# Patient Record
Sex: MALE | Race: WHITE
[De-identification: names, ages, dates, MRNs, and addresses within clinical notes are randomized per-mention and may not be internally consistent; named-entity substitution may affect disease eponyms.]

---

## 2020-01-13 ENCOUNTER — HOSPITAL ENCOUNTER (EMERGENCY)
Dept: HOSPITAL 4 - SED | Age: 45
Discharge: HOME | End: 2020-01-13
Payer: COMMERCIAL

## 2020-01-13 VITALS — SYSTOLIC BLOOD PRESSURE: 131 MMHG

## 2020-01-13 VITALS — BODY MASS INDEX: 25.77 KG/M2 | HEIGHT: 70 IN | WEIGHT: 180 LBS

## 2020-01-13 VITALS — SYSTOLIC BLOOD PRESSURE: 143 MMHG

## 2020-01-13 VITALS — HEIGHT: 70 IN | BODY MASS INDEX: 26.48 KG/M2 | WEIGHT: 185 LBS

## 2020-01-13 VITALS — SYSTOLIC BLOOD PRESSURE: 152 MMHG

## 2020-01-13 DIAGNOSIS — T78.1XXA: Primary | ICD-10-CM

## 2020-01-13 DIAGNOSIS — T78.49XA: Primary | ICD-10-CM
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
## 2020-01-13 PROCEDURE — 96372 THER/PROPH/DIAG INJ SC/IM: CPT

## 2020-01-13 PROCEDURE — 99283 EMERGENCY DEPT VISIT LOW MDM: CPT

## 2020-01-13 NOTE — NUR
Patient given written and verbal discharge instructions and verbalizes 
understanding.  ER MD discussed with patient the results and treatment 
provided. Patient in stable condition. ID arm band removed. Rx of Epi-Pen and 
Benadryl given. Patient educated on pain management and to follow up with PMD. 
Pain Scale 0. Opportunity for questions provided and answered. Medication side 
effect fact sheet provided.

## 2020-01-13 NOTE — NUR
Patient arrived in the ED c/o swollen lips that started today - Took Benadryl 
25mg PO.  Was seen yesterday for the same symptoms.  Denied any shortness of 
breath. Denied any fevers or chills.  Alert and oriented x4, respirations even 
and unlabored, speaking in full sentences and ambulating with a steady gait.  
Instructed to notify ED staff for any changes in condition or worsening of 
symptoms.  Patient verbalized understanding.

## 2020-01-13 NOTE — NUR
Pt has been medicated with Benadryl IM for symptoms. Tolerated medication well, 
will continue to monitor.

## 2020-01-13 NOTE — NUR
Patient given written and verbal discharge instructions and verbalizes 
understanding.  ER MD discussed with patient the results and treatment 
provided. Patient in stable condition. ID arm band removed. Rx of Zyrtec and 
Prednisone given. Patient educated on pain management and to follow up with 
PMD. Pain Scale 0. Opportunity for questions provided and answered. Medication 
side effect fact sheet provided.

## 2020-01-13 NOTE — NUR
Pt C/O allergic reaction since 0100 this morning and administered Epi-pen prior 
to arrival. Pt was seen a week ago at [facility] after a severe allergic reaction 
after eating spaghetti. Reports eating pizza earlier in the evening but 
symptoms did not appear untill 0100. Denies any CP, SOB, N/V, introduced any 
new foods in diet or any other symptoms at this time. Will continue to monitor.

## 2020-01-13 NOTE — NUR
Administered Decadron and Epinephrine IM as ordered by Dr. Rae.  Patient 
tolerated the medications well.  See eMAR for details.

## 2020-01-28 ENCOUNTER — HOSPITAL ENCOUNTER (EMERGENCY)
Dept: HOSPITAL 4 - SED | Age: 45
Discharge: HOME | End: 2020-01-28
Payer: COMMERCIAL

## 2020-01-28 VITALS — WEIGHT: 180 LBS | BODY MASS INDEX: 25.77 KG/M2 | HEIGHT: 70 IN

## 2020-01-28 VITALS — SYSTOLIC BLOOD PRESSURE: 119 MMHG

## 2020-01-28 VITALS — SYSTOLIC BLOOD PRESSURE: 125 MMHG

## 2020-01-28 DIAGNOSIS — F17.290: ICD-10-CM

## 2020-01-28 DIAGNOSIS — T78.3XXA: Primary | ICD-10-CM

## 2020-01-28 NOTE — NUR
-------------------------------------------------------------------------------

            *** Note undone in EDM - 01/28/20 at 0442 by ALEM ***            

-------------------------------------------------------------------------------

Pt placed to ER bed 08. Pt c/o left posterior tongue swelling since 0200. 
Allergen unknown. He had a similar episode 2 weeks ago after eating pasta 
sauce. Pt used Epi Pen 10 min PTA, no change.  Airway patent, even and 
non-labored respirations.

## 2020-01-28 NOTE — NUR
Pt placed to ER bed 08. Pt c/o left posterior tongue swelling since 0200. 
Allergen unknown. He had a similar episode 2 weeks ago after eating pasta 
sauce. Pt used Epi Pen 10 min PTA, no change.  Airway patent, even and 
non-labored respirations.

## 2020-01-28 NOTE — NUR
Patient given written and verbal discharge instructions and verbalizes 
understanding.  ER MD Bermudez discussed with patient the results and treatment 
provided. Patient in stable condition. ID arm band removed.

Rx of epipen given. Patient educated on pain management and to follow up with 
PMD. Pain Scale 0/10.

Opportunity for questions provided and answered. Medication side effect fact 
sheet provided.